# Patient Record
Sex: FEMALE | Race: WHITE | Employment: UNEMPLOYED | URBAN - METROPOLITAN AREA
[De-identification: names, ages, dates, MRNs, and addresses within clinical notes are randomized per-mention and may not be internally consistent; named-entity substitution may affect disease eponyms.]

---

## 2019-08-21 ENCOUNTER — HOSPITAL ENCOUNTER (OUTPATIENT)
Dept: MRI IMAGING | Age: 59
Discharge: HOME | End: 2019-08-21
Attending: ORTHOPAEDIC SURGERY
Payer: COMMERCIAL

## 2019-08-21 DIAGNOSIS — M48.062 LUMBAR STENOSIS WITH NEUROGENIC CLAUDICATION: ICD-10-CM

## 2019-08-21 PROCEDURE — 72148 MRI LUMBAR SPINE W/O DYE: CPT

## 2023-01-05 ENCOUNTER — OFFICE VISIT (OUTPATIENT)
Dept: ORTHOPEDIC SURGERY | Age: 63
End: 2023-01-05
Payer: COMMERCIAL

## 2023-01-05 VITALS — HEIGHT: 66 IN | BODY MASS INDEX: 32.47 KG/M2 | WEIGHT: 202 LBS

## 2023-01-05 DIAGNOSIS — M23.204 DEGENERATIVE TEAR OF LEFT MEDIAL MENISCUS: ICD-10-CM

## 2023-01-05 DIAGNOSIS — M25.562 LEFT KNEE PAIN, UNSPECIFIED CHRONICITY: Primary | ICD-10-CM

## 2023-01-05 RX ORDER — CETIRIZINE HYDROCHLORIDE 10 MG/1
TABLET ORAL
COMMUNITY
Start: 2022-12-31

## 2023-01-05 RX ORDER — FAMOTIDINE 40 MG/1
TABLET, FILM COATED ORAL
COMMUNITY
Start: 2022-12-31

## 2023-01-05 RX ORDER — CLONAZEPAM 1 MG/1
TABLET ORAL
COMMUNITY

## 2023-01-05 RX ORDER — FLUOXETINE HYDROCHLORIDE 40 MG/1
CAPSULE ORAL
COMMUNITY

## 2023-01-05 NOTE — LETTER
1/5/2023    Patient: Ashok Otero   YOB: 1960   Date of Visit: 1/5/2023     Harini Hernandez NP  [facility] [address] [address]  Via Fax: [phone]    Dear Harini Hernandez NP,      Thank you for referring Ms. Ashok Otero to [facility] for evaluation. My notes for this consultation are attached. If you have questions, please do not hesitate to call me. I look forward to following your patient along with you.       Sincerely,    Vonna Goltz, MD

## 2023-01-05 NOTE — PROGRESS NOTES
Lisa Troy (: 1960) is a 63 y.o. female, patient, here for evaluation of the following chief complaint(s):  Knee Pain (Left knee pain )       HPI:    Pain and swelling left knee. No injury. Prior arthroscopic debridement of medial meniscus tear right knee. Symptoms feel exactly the same for her. Has been seen by another orthopedist and treated with therapy type modalities as well as medications. Cannot have a shot of cortisone due to other medical issues. Here today with a painful swollen left knee. Been ongoing now for some time. Allergies   Allergen Reactions    Keflex [Cephalexin] Other (comments)     vaginitis       Current Outpatient Medications   Medication Sig    FLUoxetine (PROzac) 40 mg capsule fluoxetine 40 mg capsule   TAKE 1 CAPSULE BY MOUTH EVERY DAY    famotidine (PEPCID) 40 mg tablet TAKE 1 TABLET BY MOUTH TWICE DAILY AS DIRECTED FOR HIVES    clonazePAM (KlonoPIN) 1 mg tablet clonazepam 1 mg tablet   TAKE 1 TABLET BY MOUTH DAILY AS NEEDED FOR ANXIETY    cetirizine (ZYRTEC) 10 mg tablet TAKE 1 TABLET BY MOUTH TWICE DAILY AS DIRECTED FOR HIVES    SITagliptin-metFORMIN (JANUMET) 50-1,000 mg per tablet Take 1 Tab by mouth two (2) times daily (with meals). Indications: TYPE 2 DIABETES MELLITUS    biotin 2,500 mcg tab Take 5,000 mcg/day by mouth daily. (Patient not taking: Reported on 2023)    PREGABALIN (LYRICA PO) Take 1 Tab by mouth two (2) times a day. Indications: pain (Patient not taking: Reported on 2023)    POLYETHYLENE GLYCOL 3350 (MIRALAX PO) Take  by mouth as needed. (Patient not taking: Reported on 2023)    aspirin 81 mg tablet Take 81 mg by mouth daily. Takes 2 tabs daily (Patient not taking: Reported on 2023)    citalopram (CELEXA) 20 mg tablet Take 20 mg by mouth daily. (Patient not taking: Reported on 2023)     No current facility-administered medications for this visit.        Past Medical History:   Diagnosis Date    Adverse effect of anesthesia Same day surgery and pt admitted for \"difficulty waking up\"    Arthritis     BACK    Chronic pain     Coagulation defects     FACTOR LIDDEN MUTATION    Diabetes ([address]     GERD (gastroesophageal reflux disease)     H/O: Bell's palsy     IBS (irritable bowel syndrome)     Nausea & vomiting     Psychiatric disorder     PUD (peptic ulcer disease)         Past Surgical History:   Procedure Laterality Date    HX HEENT      T & A    HX ORTHOPAEDIC      LOWER BACK    HX ORTHOPAEDIC      RIGHT KNEE MENISCUS REPAIR    AR UNLISTED PROCEDURE ABDOMEN PERITONEUM & OMENTUM      GALL BLADDER       Family History   Problem Relation Age of Onset    Other Sister         UNABLE TO WAKE UP FOR 3 DAYS AFTER ANESTHESIA        Social History     Socioeconomic History    Marital status:      Spouse name: Not on file    Number of children: Not on file    Years of education: Not on file    Highest education level: Not on file   Occupational History    Not on file   Tobacco Use    Smoking status: Former     Packs/day: 1.00     Years: 30.00     Pack years: 30.00     Types: Cigarettes     Quit date: 4/19/2007     Years since quitting: 15.7    Smokeless tobacco: Never   Substance and Sexual Activity    Alcohol use: No    Drug use: Not on file    Sexual activity: Not on file   Other Topics Concern    Not on file   Social History Narrative    Not on file     Social Determinants of Health     Financial Resource Strain: Not on file   Food Insecurity: Not on file   Transportation Needs: Not on file   Physical Activity: Not on file   Stress: Not on file   Social Connections: Not on file   Intimate Partner Violence: Not on file   Housing Stability: Not on file             Vitals:  Ht 5' 6\" (1.676 m)   Wt 202 lb (91.6 kg)   BMI 32.60 kg/m²    Body mass index is 32.6 kg/m². PHYSICAL EXAM:  On exam today right knee is benign. Both hips are painless. Left knee has moderate-sized effusion.   She has positive medial Francisco's test and pain along the medial joint line. No knee instability. Knee range of motion is 0 to 120 degrees. There is a palpable Baker's cyst.    IMAGING:  XR Results (most recent):  Results from Appointment encounter on 01/05/23    XR KNEE LT 3 V    Narrative  3 views of left knee. Spaces well-maintained. Moderate-sized effusion. Slight narrowing of the lateral facet of patella. ASSESSMENT/PLAN:  1. Left knee pain, unspecified chronicity  -     XR KNEE LT 3 V; Future  2. Degenerative tear of left medial meniscus    Ongoing symptoms now for some time. She responded very well on her right knee to treatment of her meniscus tear. Cannot have an injection. Has done therapy modalities as well as medications. Been seen by another orthopedic surgeon. With normal x-rays need to evaluate her medial meniscus and an MRI of her knee was ordered on the left today. Follow-up after the MRI is completed. An electronic signature was used to authenticate this note.   --Alex Maier MD

## 2023-01-24 ENCOUNTER — HOSPITAL ENCOUNTER (OUTPATIENT)
Dept: MRI IMAGING | Age: 63
Discharge: HOME | End: 2023-01-24
Attending: ORTHOPAEDIC SURGERY
Payer: MEDICARE

## 2023-01-24 DIAGNOSIS — M23.204 DEGENERATIVE TEAR OF LEFT MEDIAL MENISCUS: ICD-10-CM

## 2023-01-24 DIAGNOSIS — M25.562 LEFT KNEE PAIN, UNSPECIFIED CHRONICITY: ICD-10-CM

## 2023-01-24 PROCEDURE — 73721 MRI JNT OF LWR EXTRE W/O DYE: CPT

## 2023-01-26 ENCOUNTER — OFFICE VISIT (OUTPATIENT)
Dept: ORTHOPEDIC SURGERY | Age: 63
End: 2023-01-26
Payer: COMMERCIAL

## 2023-01-26 VITALS — HEIGHT: 66 IN | BODY MASS INDEX: 32.14 KG/M2 | WEIGHT: 200 LBS

## 2023-01-26 DIAGNOSIS — S83.252D BUCKET-HANDLE TEAR OF LATERAL MENISCUS OF LEFT KNEE AS CURRENT INJURY, SUBSEQUENT ENCOUNTER: Primary | ICD-10-CM

## 2023-01-26 NOTE — PROGRESS NOTES
Mariann Harmon (: 1960) is a 66 y.o. female, patient, here for evaluation of the following chief complaint(s):  Knee Pain (Left knee MRI results - discuss treatment plan )       HPI:    Chief complaint is left knee pain. She has a displaced meniscal tear. She is here today to discuss surgical option. She has had the same problem on her right knee. Allergies   Allergen Reactions    Keflex [Cephalexin] Other (comments)     vaginitis       Current Outpatient Medications   Medication Sig    FLUoxetine (PROzac) 40 mg capsule fluoxetine 40 mg capsule   TAKE 1 CAPSULE BY MOUTH EVERY DAY    famotidine (PEPCID) 40 mg tablet TAKE 1 TABLET BY MOUTH TWICE DAILY AS DIRECTED FOR HIVES    clonazePAM (KlonoPIN) 1 mg tablet clonazepam 1 mg tablet   TAKE 1 TABLET BY MOUTH DAILY AS NEEDED FOR ANXIETY    cetirizine (ZYRTEC) 10 mg tablet TAKE 1 TABLET BY MOUTH TWICE DAILY AS DIRECTED FOR HIVES    biotin 2,500 mcg tab Take 5,000 mcg/day by mouth daily. (Patient not taking: Reported on 2023)    PREGABALIN (LYRICA PO) Take 1 Tab by mouth two (2) times a day. Indications: pain (Patient not taking: Reported on 2023)    SITagliptin-metFORMIN (JANUMET) 50-1,000 mg per tablet Take 1 Tab by mouth two (2) times daily (with meals). Indications: TYPE 2 DIABETES MELLITUS    POLYETHYLENE GLYCOL 3350 (MIRALAX PO) Take  by mouth as needed. (Patient not taking: Reported on 2023)    aspirin 81 mg tablet Take 81 mg by mouth daily. Takes 2 tabs daily (Patient not taking: Reported on 2023)    citalopram (CELEXA) 20 mg tablet Take 20 mg by mouth daily. (Patient not taking: Reported on 2023)     No current facility-administered medications for this visit.        Past Medical History:   Diagnosis Date    Adverse effect of anesthesia     Same day surgery and pt admitted for \"difficulty waking up\"    Arthritis     BACK    Chronic pain     Coagulation defects     FACTOR LIDDEN MUTATION    Diabetes ([facility] [address]     GERD (gastroesophageal reflux disease)     H/O: Bell's palsy     IBS (irritable bowel syndrome)     Nausea & vomiting     Psychiatric disorder     PUD (peptic ulcer disease)         Past Surgical History:   Procedure Laterality Date    HX HEENT      T & A    HX ORTHOPAEDIC      LOWER BACK    HX ORTHOPAEDIC      RIGHT KNEE MENISCUS REPAIR    TN UNLISTED PROCEDURE ABDOMEN PERITONEUM & OMENTUM      GALL BLADDER       Family History   Problem Relation Age of Onset    Other Sister         UNABLE TO WAKE UP FOR 3 DAYS AFTER ANESTHESIA        Social History     Socioeconomic History    Marital status:      Spouse name: Not on file    Number of children: Not on file    Years of education: Not on file    Highest education level: Not on file   Occupational History    Not on file   Tobacco Use    Smoking status: Former     Packs/day: 1.00     Years: 30.00     Pack years: 30.00     Types: Cigarettes     Quit date: 4/19/2007     Years since quitting: 15.7    Smokeless tobacco: Never   Substance and Sexual Activity    Alcohol use: No    Drug use: Not on file    Sexual activity: Not on file   Other Topics Concern    Not on file   Social History Narrative    Not on file     Social Determinants of Health     Financial Resource Strain: Not on file   Food Insecurity: Not on file   Transportation Needs: Not on file   Physical Activity: Not on file   Stress: Not on file   Social Connections: Not on file   Intimate Partner Violence: Not on file   Housing Stability: Not on file       ROS    Positive for: Musculoskeletal  Last edited by Radha Dupont on 1/26/2023  9:52 AM.            Vitals:  Ht 5' 6\" (1.676 m)   Wt 200 lb (90.7 kg)   BMI 32.28 kg/m²    Body mass index is 32.28 kg/m². PHYSICAL EXAM:  Physical exam left knee shows about a 5 degree flexion contracture due to her lateral meniscus being stuck in the front of her joint. Lateral Francisco's test is positive. She has moderate-sized effusion.       ASSESSMENT/PLAN:  1. Bucket-handle tear of lateral meniscus of left knee as current injury, subsequent encounter  Patient has lack of range of motion and a bucket-handle tear. Not going to improve without surgery. Discussed the risks and benefits. She has history of factor V Leiden. Has not been anticoagulated after her last 2 surgeries. I would like her primary care to give us some recommendations regarding this. Definitely will use aspirin postop but may use Eliquis to her Xarelto for a week depending on what her PCP says. Scheduled for left knee arthroscopy. Risks benefits were discussed. An electronic signature was used to authenticate this note.   --Javi John MD

## 2023-01-26 NOTE — LETTER
1/26/2023    Patient: Paras Willard   YOB: 1960   Date of Visit: 1/26/2023     Perez Gastelum NP  [facility] [place] [address] [address]  Via Fax: [phone]    Dear Perez Gastelum NP,      Thank you for referring Ms. Paras Willard to [facility] for evaluation. My notes for this consultation are attached. If you have questions, please do not hesitate to call me. I look forward to following your patient along with you.       Sincerely,    Saundra Titus MD

## 2023-02-01 DIAGNOSIS — S83.252D BUCKET-HANDLE TEAR OF LATERAL MENISCUS OF LEFT KNEE AS CURRENT INJURY, SUBSEQUENT ENCOUNTER: Primary | ICD-10-CM

## 2023-02-12 DIAGNOSIS — Z98.890 S/P LEFT KNEE ARTHROSCOPY: Primary | ICD-10-CM

## 2023-02-12 RX ORDER — OXYCODONE HYDROCHLORIDE 5 MG/1
5 TABLET ORAL
Qty: 42 TABLET | Refills: 0 | Status: SHIPPED | OUTPATIENT
Start: 2023-02-12 | End: 2023-02-19

## 2023-02-28 ENCOUNTER — VIRTUAL VISIT (OUTPATIENT)
Dept: ORTHOPEDIC SURGERY | Age: 63
End: 2023-02-28
Payer: COMMERCIAL

## 2023-02-28 DIAGNOSIS — S83.252D BUCKET-HANDLE TEAR OF LATERAL MENISCUS OF LEFT KNEE AS CURRENT INJURY, SUBSEQUENT ENCOUNTER: Primary | ICD-10-CM

## 2023-02-28 PROCEDURE — 99024 POSTOP FOLLOW-UP VISIT: CPT | Performed by: ORTHOPAEDIC SURGERY

## 2023-02-28 NOTE — PROGRESS NOTES
Brittany Hutchinson (: 1960) is a 66 y.o. female, patient, here for evaluation of the following chief complaint(s):  No chief complaint on file. HPI:    Visit after knee arthroscopy. Patient is doing relatively well. There are no issues. Allergies   Allergen Reactions    Keflex [Cephalexin] Other (comments)     vaginitis       Current Outpatient Medications   Medication Sig    FLUoxetine (PROzac) 40 mg capsule fluoxetine 40 mg capsule   TAKE 1 CAPSULE BY MOUTH EVERY DAY    famotidine (PEPCID) 40 mg tablet TAKE 1 TABLET BY MOUTH TWICE DAILY AS DIRECTED FOR HIVES    clonazePAM (KlonoPIN) 1 mg tablet clonazepam 1 mg tablet   TAKE 1 TABLET BY MOUTH DAILY AS NEEDED FOR ANXIETY    cetirizine (ZYRTEC) 10 mg tablet TAKE 1 TABLET BY MOUTH TWICE DAILY AS DIRECTED FOR HIVES    biotin 2,500 mcg tab Take 5,000 mcg/day by mouth daily. (Patient not taking: Reported on 2023)    PREGABALIN (LYRICA PO) Take 1 Tab by mouth two (2) times a day. Indications: pain (Patient not taking: Reported on 2023)    SITagliptin-metFORMIN (JANUMET) 50-1,000 mg per tablet Take 1 Tab by mouth two (2) times daily (with meals). Indications: TYPE 2 DIABETES MELLITUS    POLYETHYLENE GLYCOL 3350 (MIRALAX PO) Take  by mouth as needed. (Patient not taking: Reported on 2023)    aspirin 81 mg tablet Take 81 mg by mouth daily. Takes 2 tabs daily (Patient not taking: Reported on 2023)    citalopram (CELEXA) 20 mg tablet Take 20 mg by mouth daily. (Patient not taking: Reported on 2023)     No current facility-administered medications for this visit.        Past Medical History:   Diagnosis Date    Adverse effect of anesthesia     Same day surgery and pt admitted for \"difficulty waking up\"    Arthritis     BACK    Chronic pain     Coagulation defects     FACTOR LIDDEN MUTATION    Diabetes ([facility] [address]     GERD (gastroesophageal reflux disease)     H/O: Bell's palsy     IBS (irritable bowel syndrome)     Nausea & vomiting     Psychiatric disorder     PUD (peptic ulcer disease)         Past Surgical History:   Procedure Laterality Date    HX HEENT      T & A    HX ORTHOPAEDIC      LOWER BACK    HX ORTHOPAEDIC      RIGHT KNEE MENISCUS REPAIR    AK UNLISTED PROCEDURE ABDOMEN PERITONEUM & OMENTUM      GALL BLADDER       Family History   Problem Relation Age of Onset    Other Sister         UNABLE TO WAKE UP FOR 3 DAYS AFTER ANESTHESIA        Social History     Socioeconomic History    Marital status:      Spouse name: Not on file    Number of children: Not on file    Years of education: Not on file    Highest education level: Not on file   Occupational History    Not on file   Tobacco Use    Smoking status: Former     Packs/day: 1.00     Years: 30.00     Pack years: 30.00     Types: Cigarettes     Quit date: 4/19/2007     Years since quitting: 15.8    Smokeless tobacco: Never   Substance and Sexual Activity    Alcohol use: No    Drug use: Not on file    Sexual activity: Not on file   Other Topics Concern    Not on file   Social History Narrative    Not on file     Social Determinants of Health     Financial Resource Strain: Not on file   Food Insecurity: Not on file   Transportation Needs: Not on file   Physical Activity: Not on file   Stress: Not on file   Social Connections: Not on file   Intimate Partner Violence: Not on file   Housing Stability: Not on file             Vitals: There were no vitals taken for this visit. There is no height or weight on file to calculate BMI. PHYSICAL EXAM:  On physical examination which was done virtually her incisions are well-healed with knee range of motion 0 to 120 degrees. ASSESSMENT/PLAN:  1. Bucket-handle tear of lateral meniscus of left knee as current injury, subsequent encounter  Knee is much improved postsurgery. Plan for continued home physical therapy. She is going to call back when she is ready to start going to outpatient PT and we will fax a prescription to her desired location. Follow-up 1 month from now virtually. An electronic signature was used to authenticate this note.   --Bautista Haney MD